# Patient Record
Sex: MALE | Race: BLACK OR AFRICAN AMERICAN
[De-identification: names, ages, dates, MRNs, and addresses within clinical notes are randomized per-mention and may not be internally consistent; named-entity substitution may affect disease eponyms.]

---

## 2018-02-15 ENCOUNTER — HOSPITAL ENCOUNTER (OUTPATIENT)
Dept: HOSPITAL 92 - LABBT | Age: 48
Discharge: HOME | End: 2018-02-15
Attending: UROLOGY
Payer: COMMERCIAL

## 2018-02-15 DIAGNOSIS — N47.1: ICD-10-CM

## 2018-02-15 DIAGNOSIS — R31.0: ICD-10-CM

## 2018-02-15 DIAGNOSIS — Z01.818: Primary | ICD-10-CM

## 2018-02-15 DIAGNOSIS — Z12.5: ICD-10-CM

## 2018-02-15 LAB
ANION GAP SERPL CALC-SCNC: 12 MMOL/L (ref 10–20)
BUN SERPL-MCNC: 21 MG/DL (ref 8.9–20.6)
CALCIUM SERPL-MCNC: 9.9 MG/DL (ref 7.8–10.44)
CHLORIDE SERPL-SCNC: 103 MMOL/L (ref 98–107)
CO2 SERPL-SCNC: 29 MMOL/L (ref 22–29)
CREAT CL PREDICTED SERPL C-G-VRATE: 0 ML/MIN (ref 70–130)
GLUCOSE SERPL-MCNC: 157 MG/DL (ref 70–105)
HGB BLD-MCNC: 11.2 G/DL (ref 14–18)
MCH RBC QN AUTO: 25.4 PG (ref 27–31)
MCV RBC AUTO: 79.7 FL (ref 80–94)
PLATELET # BLD AUTO: 288 THOU/UL (ref 130–400)
POTASSIUM SERPL-SCNC: 4 MMOL/L (ref 3.5–5.1)
RBC # BLD AUTO: 4.42 MILL/UL (ref 4.7–6.1)
SODIUM SERPL-SCNC: 140 MMOL/L (ref 136–145)
WBC # BLD AUTO: 4.7 THOU/UL (ref 4.8–10.8)

## 2018-02-15 PROCEDURE — G0103 PSA SCREENING: HCPCS

## 2018-02-15 PROCEDURE — 85027 COMPLETE CBC AUTOMATED: CPT

## 2018-02-15 PROCEDURE — 81001 URINALYSIS AUTO W/SCOPE: CPT

## 2018-02-15 PROCEDURE — 93005 ELECTROCARDIOGRAM TRACING: CPT

## 2018-02-15 PROCEDURE — 93010 ELECTROCARDIOGRAM REPORT: CPT

## 2018-02-15 PROCEDURE — 80048 BASIC METABOLIC PNL TOTAL CA: CPT

## 2018-04-21 NOTE — EKG
Test Reason : 

Blood Pressure : ***/*** mmHG

Vent. Rate : 068 BPM     Atrial Rate : 068 BPM

   P-R Int : 152 ms          QRS Dur : 112 ms

    QT Int : 396 ms       P-R-T Axes : 043 -19 027 degrees

   QTc Int : 421 ms

 

Normal sinus rhythm

Voltage criteria for left ventricular hypertrophy

Abnormal ECG

No previous ECGs available

Confirmed by CHARBEL BURRELL MD ([id number]) on 4/21/2018 1:59:42 PM

 

Referred By:  [email or web address]           Confirmed By:CHARBEL BURRELL MD